# Patient Record
Sex: FEMALE | Race: WHITE | Employment: UNEMPLOYED | URBAN - METROPOLITAN AREA
[De-identification: names, ages, dates, MRNs, and addresses within clinical notes are randomized per-mention and may not be internally consistent; named-entity substitution may affect disease eponyms.]

---

## 2019-01-10 PROBLEM — Z34.90 NORMAL REPEAT PREGNANCY, ANTEPARTUM: Status: ACTIVE | Noted: 2019-01-10

## 2019-01-18 PROBLEM — Z36.82 ENCOUNTER FOR (NT) NUCHAL TRANSLUCENCY SCAN: Status: ACTIVE | Noted: 2019-01-18

## 2019-07-16 ENCOUNTER — ANESTHESIA (OUTPATIENT)
Dept: LABOR AND DELIVERY | Age: 29
DRG: 560 | End: 2019-07-16
Payer: MEDICAID

## 2019-07-16 ENCOUNTER — HOSPITAL ENCOUNTER (INPATIENT)
Age: 29
LOS: 2 days | Discharge: HOME | DRG: 560 | End: 2019-07-18
Attending: OBSTETRICS & GYNECOLOGY | Admitting: OBSTETRICS & GYNECOLOGY
Payer: MEDICAID

## 2019-07-16 ENCOUNTER — ANESTHESIA EVENT (OUTPATIENT)
Dept: LABOR AND DELIVERY | Age: 29
DRG: 560 | End: 2019-07-16
Payer: MEDICAID

## 2019-07-16 PROBLEM — O42.90 DELAYED DELIVERY AFTER SROM (SPONTANEOUS RUPTURE OF MEMBRANES): Status: ACTIVE | Noted: 2019-07-16

## 2019-07-16 LAB
ABO + RH BLD: NORMAL
ARTERIAL PATENCY WRIST A: ABNORMAL
ARTERIAL PATENCY WRIST A: ABNORMAL
BASE DEFICIT BLD-SCNC: 6 MMOL/L
BASE DEFICIT BLD-SCNC: 6 MMOL/L
BDY SITE: ABNORMAL
BDY SITE: ABNORMAL
BLOOD GROUP ANTIBODIES SERPL: NORMAL
BODY TEMPERATURE: 98.6
BODY TEMPERATURE: 98.6
CO2 BLD-SCNC: 20 MMOL/L
CO2 BLD-SCNC: 21 MMOL/L
COLLECT TIME,HTIME: 1058
COLLECT TIME,HTIME: 1058
ERYTHROCYTE [DISTWIDTH] IN BLOOD BY AUTOMATED COUNT: 13.2 % (ref 11.9–14.6)
GAS FLOW.O2 O2 DELIVERY SYS: ABNORMAL L/MIN
GAS FLOW.O2 O2 DELIVERY SYS: ABNORMAL L/MIN
HCO3 BLD-SCNC: 19.2 MMOL/L (ref 22–26)
HCO3 BLDV-SCNC: 19.4 MMOL/L (ref 23–28)
HCT VFR BLD AUTO: 33.3 % (ref 35.8–46.3)
HGB BLD-MCNC: 11 G/DL (ref 11.7–15.4)
MCH RBC QN AUTO: 27.6 PG (ref 26.1–32.9)
MCHC RBC AUTO-ENTMCNC: 33 G/DL (ref 31.4–35)
MCV RBC AUTO: 83.5 FL (ref 79.6–97.8)
NRBC # BLD: 0 K/UL (ref 0–0.2)
PCO2 BLDCO: 36 MMHG (ref 32–68)
PCO2 BLDCO: 36 MMHG (ref 32–68)
PH BLDCO: 7.33 [PH] (ref 7.15–7.38)
PH BLDCO: 7.34 [PH] (ref 7.15–7.38)
PLATELET # BLD AUTO: 200 K/UL (ref 150–450)
PMV BLD AUTO: 11.5 FL (ref 9.4–12.3)
PO2 BLDCO: 27 MMHG
PO2 BLDCO: 27 MMHG
RBC # BLD AUTO: 3.99 M/UL (ref 4.05–5.2)
SAO2 % BLD: 47 % (ref 95–98)
SAO2 % BLDV: 47 % (ref 65–88)
SERVICE CMNT-IMP: ABNORMAL
SERVICE CMNT-IMP: ABNORMAL
SPECIMEN EXP DATE BLD: NORMAL
SPECIMEN TYPE: ABNORMAL
SPECIMEN TYPE: ABNORMAL
WBC # BLD AUTO: 11.3 K/UL (ref 4.3–11.1)

## 2019-07-16 PROCEDURE — 75410000000 HC DELIVERY VAGINAL/SINGLE

## 2019-07-16 PROCEDURE — 74011250636 HC RX REV CODE- 250/636: Performed by: ANESTHESIOLOGY

## 2019-07-16 PROCEDURE — 76060000078 HC EPIDURAL ANESTHESIA

## 2019-07-16 PROCEDURE — 74011000250 HC RX REV CODE- 250

## 2019-07-16 PROCEDURE — 4A1HXCZ MONITORING OF PRODUCTS OF CONCEPTION, CARDIAC RATE, EXTERNAL APPROACH: ICD-10-PCS | Performed by: OBSTETRICS & GYNECOLOGY

## 2019-07-16 PROCEDURE — 74011250636 HC RX REV CODE- 250/636: Performed by: OBSTETRICS & GYNECOLOGY

## 2019-07-16 PROCEDURE — 74011250637 HC RX REV CODE- 250/637: Performed by: OBSTETRICS & GYNECOLOGY

## 2019-07-16 PROCEDURE — 74011000258 HC RX REV CODE- 258: Performed by: OBSTETRICS & GYNECOLOGY

## 2019-07-16 PROCEDURE — 74011250637 HC RX REV CODE- 250/637

## 2019-07-16 PROCEDURE — 75410000003 HC RECOV DEL/VAG/CSECN EA 0.5 HR

## 2019-07-16 PROCEDURE — 65270000029 HC RM PRIVATE

## 2019-07-16 PROCEDURE — A4300 CATH IMPL VASC ACCESS PORTAL: HCPCS | Performed by: ANESTHESIOLOGY

## 2019-07-16 PROCEDURE — 77030020255 HC SOL INJ LR 1000ML BG

## 2019-07-16 PROCEDURE — 82803 BLOOD GASES ANY COMBINATION: CPT

## 2019-07-16 PROCEDURE — 77030003028 HC SUT VCRL J&J -A

## 2019-07-16 PROCEDURE — 86900 BLOOD TYPING SEROLOGIC ABO: CPT

## 2019-07-16 PROCEDURE — 74011250636 HC RX REV CODE- 250/636

## 2019-07-16 PROCEDURE — 77030011943

## 2019-07-16 PROCEDURE — 77030014125 HC TY EPDRL BBMI -B: Performed by: ANESTHESIOLOGY

## 2019-07-16 PROCEDURE — 77030018846 HC SOL IRR STRL H20 ICUM -A

## 2019-07-16 PROCEDURE — 36415 COLL VENOUS BLD VENIPUNCTURE: CPT

## 2019-07-16 PROCEDURE — 0KQM0ZZ REPAIR PERINEUM MUSCLE, OPEN APPROACH: ICD-10-PCS | Performed by: OBSTETRICS & GYNECOLOGY

## 2019-07-16 PROCEDURE — 99283 EMERGENCY DEPT VISIT LOW MDM: CPT | Performed by: OBSTETRICS & GYNECOLOGY

## 2019-07-16 PROCEDURE — 75410000002 HC LABOR FEE PER 1 HR

## 2019-07-16 PROCEDURE — 85027 COMPLETE CBC AUTOMATED: CPT

## 2019-07-16 RX ORDER — SODIUM CHLORIDE 0.9 % (FLUSH) 0.9 %
5-40 SYRINGE (ML) INJECTION EVERY 8 HOURS
Status: DISCONTINUED | OUTPATIENT
Start: 2019-07-16 | End: 2019-07-16

## 2019-07-16 RX ORDER — OXYTOCIN/RINGER'S LACTATE 30/500 ML
500 PLASTIC BAG, INJECTION (ML) INTRAVENOUS
Status: COMPLETED | OUTPATIENT
Start: 2019-07-16 | End: 2019-07-16

## 2019-07-16 RX ORDER — ZOLPIDEM TARTRATE 5 MG/1
5 TABLET ORAL
Status: DISCONTINUED | OUTPATIENT
Start: 2019-07-16 | End: 2019-07-18 | Stop reason: HOSPADM

## 2019-07-16 RX ORDER — FAMOTIDINE 20 MG/1
20 TABLET, FILM COATED ORAL ONCE
Status: DISCONTINUED | OUTPATIENT
Start: 2019-07-16 | End: 2019-07-16

## 2019-07-16 RX ORDER — ONDANSETRON 4 MG/1
4 TABLET, ORALLY DISINTEGRATING ORAL
Status: DISCONTINUED | OUTPATIENT
Start: 2019-07-16 | End: 2019-07-18 | Stop reason: HOSPADM

## 2019-07-16 RX ORDER — DOCUSATE SODIUM 100 MG/1
100 CAPSULE, LIQUID FILLED ORAL 2 TIMES DAILY
Status: DISCONTINUED | OUTPATIENT
Start: 2019-07-16 | End: 2019-07-18 | Stop reason: HOSPADM

## 2019-07-16 RX ORDER — MINERAL OIL
120 OIL (ML) ORAL
Status: COMPLETED | OUTPATIENT
Start: 2019-07-16 | End: 2019-07-16

## 2019-07-16 RX ORDER — ROPIVACAINE HYDROCHLORIDE 2 MG/ML
INJECTION, SOLUTION EPIDURAL; INFILTRATION; PERINEURAL
Status: DISCONTINUED | OUTPATIENT
Start: 2019-07-16 | End: 2019-07-16 | Stop reason: HOSPADM

## 2019-07-16 RX ORDER — DIPHENHYDRAMINE HCL 25 MG
25 CAPSULE ORAL
Status: DISCONTINUED | OUTPATIENT
Start: 2019-07-16 | End: 2019-07-18 | Stop reason: HOSPADM

## 2019-07-16 RX ORDER — MISOPROSTOL 200 UG/1
TABLET ORAL
Status: COMPLETED
Start: 2019-07-16 | End: 2019-07-16

## 2019-07-16 RX ORDER — HYDROCODONE BITARTRATE AND ACETAMINOPHEN 7.5; 325 MG/1; MG/1
1 TABLET ORAL
Status: DISCONTINUED | OUTPATIENT
Start: 2019-07-16 | End: 2019-07-18 | Stop reason: HOSPADM

## 2019-07-16 RX ORDER — SODIUM CHLORIDE 0.9 % (FLUSH) 0.9 %
5-40 SYRINGE (ML) INJECTION AS NEEDED
Status: DISCONTINUED | OUTPATIENT
Start: 2019-07-16 | End: 2019-07-16

## 2019-07-16 RX ORDER — LIDOCAINE HYDROCHLORIDE 20 MG/ML
JELLY TOPICAL ONCE
Status: DISCONTINUED | OUTPATIENT
Start: 2019-07-16 | End: 2019-07-16

## 2019-07-16 RX ORDER — LIDOCAINE HYDROCHLORIDE 20 MG/ML
JELLY TOPICAL
Status: DISCONTINUED | OUTPATIENT
Start: 2019-07-16 | End: 2019-07-16 | Stop reason: RX

## 2019-07-16 RX ORDER — LOPERAMIDE HYDROCHLORIDE 2 MG/1
2 CAPSULE ORAL AS NEEDED
Status: DISCONTINUED | OUTPATIENT
Start: 2019-07-16 | End: 2019-07-18 | Stop reason: HOSPADM

## 2019-07-16 RX ORDER — OXYTOCIN/RINGER'S LACTATE 30/500 ML
PLASTIC BAG, INJECTION (ML) INTRAVENOUS
Status: COMPLETED
Start: 2019-07-16 | End: 2019-07-16

## 2019-07-16 RX ORDER — OXYTOCIN/RINGER'S LACTATE 15/250 ML
250 PLASTIC BAG, INJECTION (ML) INTRAVENOUS ONCE
Status: COMPLETED | OUTPATIENT
Start: 2019-07-16 | End: 2019-07-16

## 2019-07-16 RX ORDER — IBUPROFEN 800 MG/1
800 TABLET ORAL
Status: DISCONTINUED | OUTPATIENT
Start: 2019-07-16 | End: 2019-07-18 | Stop reason: HOSPADM

## 2019-07-16 RX ORDER — ROPIVACAINE HYDROCHLORIDE 2 MG/ML
INJECTION, SOLUTION EPIDURAL; INFILTRATION; PERINEURAL AS NEEDED
Status: DISCONTINUED | OUTPATIENT
Start: 2019-07-16 | End: 2019-07-16 | Stop reason: HOSPADM

## 2019-07-16 RX ORDER — LIDOCAINE HYDROCHLORIDE AND EPINEPHRINE 15; 5 MG/ML; UG/ML
INJECTION, SOLUTION EPIDURAL
Status: COMPLETED | OUTPATIENT
Start: 2019-07-16 | End: 2019-07-16

## 2019-07-16 RX ORDER — BUTORPHANOL TARTRATE 2 MG/ML
1 INJECTION INTRAMUSCULAR; INTRAVENOUS
Status: DISCONTINUED | OUTPATIENT
Start: 2019-07-16 | End: 2019-07-16

## 2019-07-16 RX ORDER — DEXTROSE, SODIUM CHLORIDE, SODIUM LACTATE, POTASSIUM CHLORIDE, AND CALCIUM CHLORIDE 5; .6; .31; .03; .02 G/100ML; G/100ML; G/100ML; G/100ML; G/100ML
125 INJECTION, SOLUTION INTRAVENOUS CONTINUOUS
Status: DISCONTINUED | OUTPATIENT
Start: 2019-07-16 | End: 2019-07-16

## 2019-07-16 RX ORDER — MISOPROSTOL 200 UG/1
600 TABLET ORAL
Status: COMPLETED | OUTPATIENT
Start: 2019-07-16 | End: 2019-07-16

## 2019-07-16 RX ORDER — SIMETHICONE 80 MG
80 TABLET,CHEWABLE ORAL
Status: DISCONTINUED | OUTPATIENT
Start: 2019-07-16 | End: 2019-07-18 | Stop reason: HOSPADM

## 2019-07-16 RX ORDER — LIDOCAINE HYDROCHLORIDE 10 MG/ML
1 INJECTION INFILTRATION; PERINEURAL
Status: DISCONTINUED | OUTPATIENT
Start: 2019-07-16 | End: 2019-07-16

## 2019-07-16 RX ADMIN — LIDOCAINE HYDROCHLORIDE AND EPINEPHRINE 3.5 ML: 15; 5 INJECTION, SOLUTION EPIDURAL at 08:52

## 2019-07-16 RX ADMIN — WITCH HAZEL 1 PAD: 500 SOLUTION RECTAL; TOPICAL at 14:32

## 2019-07-16 RX ADMIN — DOCUSATE SODIUM 100 MG: 100 CAPSULE, LIQUID FILLED ORAL at 17:04

## 2019-07-16 RX ADMIN — MINERAL OIL 120 ML: 471.95 OIL ORAL at 10:36

## 2019-07-16 RX ADMIN — Medication 30000 MILLI-UNITS/HR: at 11:28

## 2019-07-16 RX ADMIN — SODIUM CHLORIDE 5 MILLION UNITS: 900 INJECTION, SOLUTION INTRAVENOUS at 07:39

## 2019-07-16 RX ADMIN — OXYTOCIN 30000 MILLI-UNITS/HR: 10 INJECTION, SOLUTION INTRAMUSCULAR; INTRAVENOUS at 11:28

## 2019-07-16 RX ADMIN — Medication 1 AMPULE: at 21:51

## 2019-07-16 RX ADMIN — Medication 1 AMPULE: at 14:32

## 2019-07-16 RX ADMIN — ROPIVACAINE HYDROCHLORIDE 5 ML: 2 INJECTION, SOLUTION EPIDURAL; INFILTRATION; PERINEURAL at 08:56

## 2019-07-16 RX ADMIN — ROPIVACAINE HYDROCHLORIDE 10 ML/HR: 2 INJECTION, SOLUTION EPIDURAL; INFILTRATION; PERINEURAL at 08:58

## 2019-07-16 RX ADMIN — IBUPROFEN 800 MG: 800 TABLET, FILM COATED ORAL at 14:42

## 2019-07-16 RX ADMIN — MISOPROSTOL 600 MCG: 200 TABLET ORAL at 11:29

## 2019-07-16 RX ADMIN — SODIUM CHLORIDE, SODIUM LACTATE, POTASSIUM CHLORIDE, CALCIUM CHLORIDE, AND DEXTROSE MONOHYDRATE 125 ML/HR: 600; 310; 30; 20; 5 INJECTION, SOLUTION INTRAVENOUS at 07:39

## 2019-07-16 RX ADMIN — ROPIVACAINE HYDROCHLORIDE 5 ML: 2 INJECTION, SOLUTION EPIDURAL; INFILTRATION; PERINEURAL at 08:58

## 2019-07-16 RX ADMIN — SODIUM CHLORIDE, SODIUM LACTATE, POTASSIUM CHLORIDE, AND CALCIUM CHLORIDE 1000 ML: 600; 310; 30; 20 INJECTION, SOLUTION INTRAVENOUS at 09:45

## 2019-07-16 RX ADMIN — Medication 15000 MILLI-UNITS/HR: at 11:29

## 2019-07-16 RX ADMIN — IBUPROFEN 800 MG: 800 TABLET, FILM COATED ORAL at 21:51

## 2019-07-16 NOTE — PROGRESS NOTES
Delivery and repair by Dr. Christelle Callejas, I was tied up in another delivery. Mom and baby stable. Some bleeding, cytotec given. Will watch, give methergine if needed.

## 2019-07-16 NOTE — PROGRESS NOTES
1030 Pt reports increased pain with contractions. SVE C/C/+2. MD made aware. Finishing case, will be in to assess. 36 MD at bedside. Begin pushing. 1 MD called out of room for emergency. Dr. Sharon Walker at bedside. 44  male apgars 8&9. Skin to skin with Mom.  1103 Placenta expressed. Repair in progress. 1115 Legs down solomon care completed.

## 2019-07-16 NOTE — ANESTHESIA PREPROCEDURE EVALUATION
Relevant Problems   No relevant active problems       Anesthetic History   No history of anesthetic complications            Review of Systems / Medical History  Patient summary reviewed and pertinent labs reviewed    Pulmonary  Within defined limits                 Neuro/Psych   Within defined limits           Cardiovascular  Within defined limits                Exercise tolerance: >4 METS     GI/Hepatic/Renal  Within defined limits              Endo/Other  Within defined limits           Other Findings              Physical Exam    Airway  Mallampati: II  TM Distance: 4 - 6 cm  Neck ROM: normal range of motion   Mouth opening: Normal     Cardiovascular               Dental  No notable dental hx       Pulmonary                 Abdominal         Other Findings            Anesthetic Plan    ASA: 2  Anesthesia type: epidural            Anesthetic plan and risks discussed with: Patient

## 2019-07-16 NOTE — H&P
History & Physical    Name: Carmel Marvin MRN: [id number]  SSN: [id number]    YOB: 1990  Age: 30 y.o. Sex: female        Subjective:     Estimated Date of Delivery: 19  OB History    Para Term  AB Living   4 1 1 0 2 1   SAB TAB Ectopic Molar Multiple Live Births   0 0 0 0 0 1      # Outcome Date GA Lbr Junior/2nd Weight Sex Delivery Anes PTL Lv   4 Current            3 Term 16 39w0d 05:35 / 00:53 3.551 kg F Vag-Spont Local N YUSRA   2 AB            1 AB                Ms. Walter Nails is seen with pregnancy at 38w3d for SROM at 0430, clear, now w painful contractions. Prenatal course was normal.  the patients states that the baby moves as usual   Please see prenatal records for details. Past Medical History:   Diagnosis Date    Anemia     Chlamydia          No past surgical history on file. Social History     Occupational History    Not on file   Tobacco Use    Smoking status: Never Smoker    Smokeless tobacco: Never Used   Substance and Sexual Activity    Alcohol use: No    Drug use: No    Sexual activity: Yes     Partners: Male     Birth control/protection: None     Family History   Problem Relation Age of Onset    Diabetes Neg Hx     Hypertension Neg Hx        No Known Allergies  Prior to Admission medications    Medication Sig Start Date End Date Taking? Authorizing Provider   cholecalciferol, vitamin D3, (VITAMIN D3) 2,000 unit tab Take  by mouth. Yes Provider, Historical   PNV with Ca No.65-Iron Poly-FA 60 mg iron-1 mg cap Take  by mouth.  Indications: Pregnancy   Yes Provider, Historical        Review of Systems:  Constitutional:No headache, fever  Cardiac:   No chest pain      Resp: No cough or shortness of breath     GI:   No nausea/vomiting, diarrhea, abdominal pain    :   No dysuria  Neuro:     No vision changes, headache      Objective:     Vitals:  Vitals:    19 0642 19 0643 19 0644 19 0701   BP: 138/83 138/83  146/88 Pulse:  (!) 130  (!) 122   Resp: 20   20   Temp:    100.3 °F (37.9 °C)   Weight:   83 kg (183 lb)    Height:   5' 4\" (1.626 m)         Physical Exam:  Patient without distress. Heart: Regular rate and rhythm  Lung: clear to auscultation throughout lung fields, no wheezes, no rales, no rhonchi and normal respiratory effort  Back: costovertebral angle tenderness absent  Abdomen: soft, nontender  Fundus: soft and non tender  Cervical Exam: 4 cm dilated    70% effaced    -2 station    Presenting Part: cephalic  Lower Extremities:  - Edema No  Membranes:  Spontaneous Rupture of Membranes; Amniotic Fluid: clear fluid  Fetal Heart Rate: Baseline: 150 per minute  Variability: moderate  Accelerations: yes  Decelerations: none  Uterine contractions: regular, every 3 minutes    Prenatal Labs:   Lab Results   Component Value Date/Time    Rubella, External Immune 10/03/2016    GrBStrep, External Positive 2019    HBsAg, External Negative 10/03/2016    RPR, External NonReactive 10/03/2016         Assessment/Plan:     Ms. Vinita Valladares is a  seen with pregnancy at 38w3d for active labor. Lab Results   Component Value Date/Time    GrBStrep, External Positive 2019       Plan:     Admit for labor management, PCN for pos GBS,will follow her temp, cbc pending.     Patient discussed with Dr. Nelly Wilson

## 2019-07-16 NOTE — PROGRESS NOTES
Dr. Sharon Walker at bedside. SVE clots expressed. Moderate bleeding, fundus firm. Extra pitocin and Cytotec buccal given per MD order.

## 2019-07-16 NOTE — LACTATION NOTE
This note was copied from a baby's chart. Mother wanting to breastfeed at this time. This RN assisted mother with getting infant latched on left side, mother noted to have short nipples. This RN was able to express several small drops, baby latched for only a few seconds. Baby would only suck continuously for 11 sucks then stopped. Educated mother to hold breast tissue back to allow one nostril to be open to air. Tried to stimulate baby by rubbing head. Baby fed for a few more seconds then stopped. Mother requested to get baby swaddled and to attempt again later because she has visitors waiting. Baby swaddled and mother holding baby upon this RN leaving the room.

## 2019-07-16 NOTE — PROGRESS NOTES
SBAR IN Report: Mother    Verbal report received from Angela Chen (full name & credentials) on this patient, who is now being transferred from L&D (unit) for routine progression of care. The patient is not wearing a green \"Anesthesia-Duramorph\" band. Report consisted of patient's Situation, Background, Assessment and Recommendations (SBAR). Griffin ID bands were compared with the identification form, and verified with the patient and transferring nurse. Information from the SBAR, Procedure Summary, Intake/Output, MAR and Recent Results and the [facility] Report was reviewed with the transferring nurse; opportunity for questions and clarification provided.

## 2019-07-16 NOTE — ANESTHESIA PROCEDURE NOTES
Epidural Block    Start time: 7/16/2019 8:49 AM  End time: 7/16/2019 8:55 AM  Performed by: Brandon Solis MD  Authorized by: Brandon Solis MD     Pre-Procedure  Indication: labor epidural    Preanesthetic Checklist: patient identified, risks and benefits discussed, anesthesia consent, site marked, patient being monitored, timeout performed and anesthesia consent    Timeout Time: 08:48        Epidural:   Patient position:  Seated  Prep region:  Lumbar  Prep: Patient draped and Chlorhexidine    Location:  L3-4    Needle and Epidural Catheter:   Needle Type:  Tuohy  Needle Gauge:  19 G  Injection Technique:  Loss of resistance using saline  Attempts:  1  Catheter Size:  19 G  Catheter at Skin Depth (cm):  12  Depth in Epidural Space (cm):  6  Events: no blood with aspiration, no cerebrospinal fluid with aspiration, no paresthesia and negative aspiration test    Test Dose:  Lidocaine 1.5% w/ epi and negative    Assessment:   Catheter Secured:  Tegaderm and tape  Insertion:  Uncomplicated  Patient tolerance:  Patient tolerated the procedure well with no immediate complications

## 2019-07-16 NOTE — L&D DELIVERY NOTE
Delivery Summary    Patient: Amilcar Mendez MRN: [id number]  SSN: [id number]    YOB: 1990  Age: 30 y.o. Sex: female       Information for the patient's :  Ravi Cuong [[id number]]       Labor Events:    Labor: No    Steroids: None   Cervical Ripening Date/Time:       Cervical Ripening Type: None   Antibiotics During Labor: Yes   Rupture Identifier:      Rupture Date/Time: 2019 4:30 AM   Rupture Type: SROM   Amniotic Fluid Volume:      Amniotic Fluid Description:      Amniotic Fluid Odor:      Induction: None       Induction Date/Time:        Indications for Induction:      Augmentation: None   Augmentation Date/Time:      Indications for Augmentation:     Labor complications: None       Additional complications:        Delivery Events:  Indications For Episiotomy:     Episiotomy: None   Perineal Laceration(s): 2nd   Repaired:     Periurethral Laceration Location:      Repaired:     Labial Laceration Location:     Repaired:     Sulcal Laceration Location:     Repaired:     Vaginal Laceration Location:     Repaired:     Cervical Laceration Location:     Repaired:     Repair Suture: Vicryl 3-0   Number of Repair Packets: 2   Estimated Blood Loss (ml): 400ml     Delivery Date: 2019    Delivery Time: 10:58 AM  Delivery Type: Vaginal, Spontaneous  Sex:  Male    Gestational Age: 36w4d   Delivery Clinician:  Luiz Broderick  Living Status: Living   Delivery Location: L&D 428          APGARS  One minute Five minutes Ten minutes   Skin color: 0   1        Heart rate: 2   2        Grimace: 2   2        Muscle tone: 2   2        Breathin   2        Totals: 8   9            Presentation: Vertex    Position: Left Occiput Anterior  Resuscitation Method:  Tactile Stimulation;Suctioning-bulb     Meconium Stained: None      Cord Information: 3 Vessels  Complications: None  Cord around:    Delayed cord clamping?  No  Cord clamped date/time:2019 10:59 AM  Disposition of Cord Blood: Lab    Blood Gases Sent?: Yes    Placenta:  Date/Time: 2019 11:03 AM  Removal: Expressed      Appearance: Normal      Measurements:  Birth Weight: 3.725 kg      Birth Length:        Head Circumference:        Chest Circumference:       Abdominal Girth: Other Providers:   KYLE Cosme;ANTONIETA RATLIFF;AMALIA HICKEY;GALDINO LOZADA, Obstetrician;Primary Nurse;Scrub Tech; Anesthesiologist;Crna           Group B Strep:   Lab Results   Component Value Date/Time    GrBStrep, External Positive 2019     Information for the patient's :  Marlee Hankins [[id number]]   No results found for: ABORH, PCTABR, PCTDIG, BILI, ABORHEXT, ABORH    No results for input(s): PCO2CB, PO2CB, HCO3I, SO2I, IBD, PTEMPI, SPECTI, PHICB, ISITE, IDEV, IALLEN in the last 72 hours. Pt experienced uncomplicated  of viable male, Apgars 8/9. Placenta delivered spontaneously intact. EBL 400mls. 2nd degree perineal/vaginal laceration repaired with 3-0 Vicryl. Pt tolerated the procedure well.

## 2019-07-16 NOTE — PROGRESS NOTES
0845 Anesthesia in room. 5060 Timeout per anesthesia record. Pt to side of bed for epidural placement. 0714 Epidural cath in place. Test dose per MD. Serial BP as documented. 0859 Pt to left hip tilt. Tolerated well.

## 2019-07-16 NOTE — PROGRESS NOTES
Patient presents to triage from home with complaints of SROM and contractions, denies any bleeding reports good fetal movement.

## 2019-07-16 NOTE — LACTATION NOTE
This note was copied from a baby's chart. Mom reports feedings going well. Concerns about supply with first baby. Reviewed she may want to insurance pump within the next 1-2 days. Reviewed first 24 hour expectations. Discussed feeding expectations in second day. Encouraged to try at breast.  Encouraged skin to skin. Reviewed Breastfeeding Packet. Baby last nursed 1 hours ago. Offered assistance with positioning. Mom declined for now, but would like an observation prior to discharge.

## 2019-07-16 NOTE — LACTATION NOTE

## 2019-07-16 NOTE — PROGRESS NOTES
Pt oriented to room and call light. Admission assessment complete see flowsheet. Pt denies pain at this time. Discussed today plan of care with pt. Pt instructed to call for assistance prior to getting OOB. Pt voiced understanding. Pt to call with needs/concerns. No sign/symptoms of distress noted. Pt in bed eating with call light in reach.

## 2019-07-16 NOTE — PROGRESS NOTES
Pt called out due to c/o increase vaginal bleeding. Upon assessment fundus firm/-1 midline with small bleeding noted. Quiana care performed. No sign/symptoms of distress noted. Pt in bed with call light in reach.

## 2019-07-16 NOTE — PROGRESS NOTES
Pt up and ambulated to bathroom with no complaints. Pt able to void 700ml, no clots noted in toilet. Solmoon care performed by pt, solomon pad changed. Gown changed. Pt ambulated back to bed, pt tolerated ambulating well. Pt in bed with call light in reach.

## 2019-07-16 NOTE — PROGRESS NOTES
SBAR OUT Report: Mother    Verbal report given to Lev Sosa RN on this patient, who is now being transferred to [facility] for routine progression of care. The patient is not wearing a green \"Anesthesia-Duramorph\" band. Report consisted of patient's Situation, Background, Assessment and Recommendations (SBAR).  ID bands were compared with the identification form, and verified with the patient and receiving nurse. Information from the SBAR and the [address] [facility] Report was reviewed with the receiving nurse; opportunity for questions and clarification provided.

## 2019-07-17 PROCEDURE — 65270000029 HC RM PRIVATE

## 2019-07-17 PROCEDURE — 74011250637 HC RX REV CODE- 250/637: Performed by: OBSTETRICS & GYNECOLOGY

## 2019-07-17 RX ADMIN — DOCUSATE SODIUM 100 MG: 100 CAPSULE, LIQUID FILLED ORAL at 07:12

## 2019-07-17 RX ADMIN — IBUPROFEN 800 MG: 800 TABLET, FILM COATED ORAL at 21:23

## 2019-07-17 RX ADMIN — DOCUSATE SODIUM 100 MG: 100 CAPSULE, LIQUID FILLED ORAL at 17:50

## 2019-07-17 RX ADMIN — Medication 1 AMPULE: at 07:12

## 2019-07-17 RX ADMIN — IBUPROFEN 800 MG: 800 TABLET, FILM COATED ORAL at 07:13

## 2019-07-17 RX ADMIN — Medication 1 AMPULE: at 21:23

## 2019-07-17 RX ADMIN — IBUPROFEN 800 MG: 800 TABLET, FILM COATED ORAL at 15:34

## 2019-07-17 NOTE — PROGRESS NOTES
Progress Note                               Patient: Rafat Chanel MRN: [id number]  SSN: [id number]    YOB: 1990  Age: 30 y.o. Sex: female      Postpartum Day Number 1    Subjective:     Patient doing well postpartum without significant complaints. Voiding without difficulty. Patient reports normal lochia. . Breastfeeding: Yes     Objective:     Patient Vitals for the past 18 hrs:   Temp Pulse Resp BP SpO2   19 0700 97.8 °F (36.6 °C) 96 18 117/57 99 %   19 1938 98.8 °F (37.1 °C) 97 16 117/68 97 %        Temp (24hrs), Av.8 °F (37.7 °C), Min:97.8 °F (36.6 °C), Max:101.6 °F (38.7 °C)      Physical Exam:    General:   Patient without distress. Abdomen: Soft, fundus firm at level of umbilicus, nontender   Lower Extremities: Negative for swelling, cords, or tenderness. Lab/Data Review:  CBC:    Recent Labs     19  0748   WBC 11.3*   HGB 11.0*   HCT 33.3*        Blood Type:   Lab Results   Component Value Date/Time    ABO/Rh(D) B POSITIVE 2019 07:48 AM    ABO,Rh B Positive 10/03/2016      Infant's Blood Type: Information for the patient's :  Noris Card [[id number]]     Lab Results   Component Value Date/Time    ABO/Rh(D) A NEGATIVE 2019 10:58 AM       Assessment and Plan:     Patient appears to be having an uncomplicated postpartum course. Continue routine perineal care and maternal education.     Sun Can MD  12:56 PM

## 2019-07-18 VITALS
SYSTOLIC BLOOD PRESSURE: 113 MMHG | WEIGHT: 183 LBS | HEART RATE: 82 BPM | OXYGEN SATURATION: 97 % | BODY MASS INDEX: 31.24 KG/M2 | RESPIRATION RATE: 16 BRPM | DIASTOLIC BLOOD PRESSURE: 48 MMHG | TEMPERATURE: 97.8 F | HEIGHT: 64 IN

## 2019-07-18 PROCEDURE — 74011250637 HC RX REV CODE- 250/637: Performed by: OBSTETRICS & GYNECOLOGY

## 2019-07-18 RX ORDER — IBUPROFEN 800 MG/1
800 TABLET ORAL
Qty: 90 TAB | Refills: 1 | Status: SHIPPED | OUTPATIENT
Start: 2019-07-18

## 2019-07-18 RX ADMIN — IBUPROFEN 800 MG: 800 TABLET, FILM COATED ORAL at 05:17

## 2019-07-18 RX ADMIN — Medication 1 AMPULE: at 10:27

## 2019-07-18 RX ADMIN — DOCUSATE SODIUM 100 MG: 100 CAPSULE, LIQUID FILLED ORAL at 10:27

## 2019-07-18 NOTE — PROGRESS NOTES
Progress Note                               Patient: Richardson Muse MRN: [id number]  SSN: [id number]    YOB: 1990  Age: 30 y.o. Sex: female      Postpartum Day Number 2    Subjective:     Patient doing well postpartum without significant complaints. Voiding without difficulty. Patient reports normal lochia. Pain well controlled, voiding on her own without difficulty. Breast feeding. Denies HA, SOB/CP, RUQ pain, Vision changes. Objective:     Patient Vitals for the past 12 hrs:   Temp Pulse Resp BP SpO2   19 0700 97.8 °F (36.6 °C) 82 16 113/48 97 %       Temp (24hrs), Av.8 °F (36.6 °C), Min:97.7 °F (36.5 °C), Max:97.8 °F (36.6 °C)      Physical Exam:    Constitutional: She appears well-developed and well-nourished. No distress. HENT:    Head: Normocephalic and atraumatic.    Cardiovascular: RRR  Pulmonary/Chest: CTAB  Abd:  S/NTTP/ND, BS normoactive, fundus firm at umbilicus  Ext:  No c/c/e      Lab/Data Review:  CBC:    Recent Labs     19  0748   WBC 11.3*   HGB 11.0*   HCT 33.3*        GBS:   Lab Results   Component Value Date/Time    GrBStrep, External Positive 2019     Blood Type:   Lab Results   Component Value Date/Time    ABO/Rh(D) B POSITIVE 2019 07:48 AM    ABO,Rh B Positive 10/03/2016        Assessment and Plan:     30 y.o.  ppd# 2 s/p :    1) PP:  Meeting all pp goals, continue routine care; appropriate for DC home today  2) Breast feeding, Rh pos, Rub imm          Signed By: Loretta Suero MD     2019

## 2019-07-18 NOTE — PROGRESS NOTES
Chart reviewed - no needs identified.  made introduction to family and provided informational packet on  mood disorder education/resources. Patient denies any history of postpartum depression/anxiety. Family receptive to receiving information and denied any additional needs from . Patient does not have a PCP - list of PCPs provided. Family has 's contact information should any needs/questions arise.     RACHEL Fernando  [facility]   [phone]

## 2019-07-18 NOTE — LACTATION NOTE
This note was copied from a baby's chart. In to follow up w/ mom and infant. Baby has been doing only short feeds mostly for first day. Baby just circumcised one hr ago. Reviewed 2nd 24 hr feeding/output expectations. Will meet mom for next feeding. Everyone in room napping.

## 2019-07-18 NOTE — DISCHARGE INSTRUCTIONS
After Your Delivery (the Postpartum Period): Care Instructions  Your Care Instructions    Congratulations on the birth of your baby. Like pregnancy, the  period can be a time of excitement, bree, and exhaustion. You may look at your wondrous little baby and feel happy. You may also be overwhelmed by your new sleep hours and new responsibilities. At first, babies often sleep during the days and are awake at night. They do not have a pattern or routine. They may make sudden gasps, jerk themselves awake, or look like they have crossed eyes. These are all normal, and they may even make you smile. In these first weeks after delivery, try to take good care of yourself. It may take 4 to 6 weeks to feel like yourself again, and possibly longer if you had a  birth. You will likely feel very tired for several weeks. Your days will be full of ups and downs, but lots of bree as well. Follow-up care is a key part of your treatment and safety. Be sure to make and go to all appointments, and call your doctor if you are having problems. It's also a good idea to know your test results and keep a list of the medicines you take. How can you care for yourself at home? Take care of your body after delivery  · Use pads instead of tampons for the bloody flow that may last as long as 2 weeks. · Ease cramps with ibuprofen (Advil, Motrin). · Ease soreness of hemorrhoids and the area between your vagina and rectum with ice compresses or witch hazel pads. · Ease constipation by drinking lots of fluid and eating high-fiber foods. Ask your doctor about over-the-counter stool softeners. · Cleanse yourself with a gentle squeeze of warm water from a bottle instead of wiping with toilet paper. · Take a sitz bath in warm water several times a day. · Wear a good nursing bra. Ease sore and swollen breasts with warm, wet washcloths. · If you are not breastfeeding, use ice rather than heat for breast soreness.   · Your period may not start for several months if you are breastfeeding. You may bleed more, and longer at first, than you did before you got pregnant. · Wait until you are healed (about 4 to 6 weeks) before you have sexual intercourse. Your doctor will tell you when it is okay to have sex. · Try not to travel with your baby for 5 or 6 weeks. If you take a long car trip, make frequent stops to walk around and stretch. Avoid exhaustion  · Rest every day. Try to nap when your baby naps. · Ask another adult to be with you for a few days after delivery. · Plan for  if you have other children. · Stay flexible so you can eat at odd hours and sleep when you need to. Both you and your baby are making new schedules. · Plan small trips to get out of the house. Change can make you feel less tired. · Ask for help with housework, cooking, and shopping. Remind yourself that your job is to care for your baby. Know about help for postpartum depression  · \"Baby blues\" are common for the first 1 to 2 weeks after birth. You may cry or feel sad or irritable for no reason. · Rest whenever you can. Being tired makes it harder to handle your emotions. · Go for walks with your baby. · Talk to your partner, friends, and family about your feelings. · If your symptoms last for more than a few weeks, or if you feel very depressed, ask your doctor for help. · Postpartum depression can be treated. Support groups and counseling can help. Sometimes medicine can also help. Stay healthy  · Eat healthy foods so you have more energy and lose extra baby pounds. · If you breastfeed, avoid drugs. If you quit smoking during pregnancy, try to stay smoke-free. If you choose to have a drink now and then, have only one drink, and limit the number of occasions that you have a drink. Wait to breastfeed at least 2 hours after you have a drink to reduce the amount of alcohol the baby may get in the milk.   · Start daily exercise after 4 to 6 weeks, but rest when you feel tired. · Learn exercises to tone your belly. Do Kegel exercises to regain strength in your pelvic muscles. You can do these exercises while you stand or sit. ? Squeeze the same muscles you would use to stop your urine. Your belly and thighs should not move. ? Hold the squeeze for 3 seconds, and then relax for 3 seconds. ? Start with 3 seconds. Then add 1 second each week until you are able to squeeze for 10 seconds. ? Repeat the exercise 10 to 15 times for each session. Do three or more sessions each day. · Find a class for new mothers and new babies that has an exercise time. · If you had a  birth, give yourself a bit more time before you exercise, and be careful. When should you call for help? Call 911 anytime you think you may need emergency care. For example, call if:    · You passed out (lost consciousness).    Call your doctor now or seek immediate medical care if:    · You have severe vaginal bleeding. This means you are passing blood clots and soaking through a pad each hour for 2 or more hours.     · You are dizzy or lightheaded, or you feel like you may faint.     · You have a fever.     · You have new belly pain, or your pain gets worse.    Watch closely for changes in your health, and be sure to contact your doctor if:    · Your vaginal bleeding seems to be getting heavier.     · You have new or worse vaginal discharge.     · You feel sad, anxious, or hopeless for more than a few days.     · You do not get better as expected. Where can you learn more? Go to http://lexa-flori.info/. Enter A461 in the search box to learn more about \"After Your Delivery (the Postpartum Period): Care Instructions. \"  Current as of: 2018  Content Version: 11.9  © 3431-9672 [employer]. Care instructions adapted under license by [employer] (which disclaims liability or warranty for this information).  If you have questions about a medical condition or this instruction, always ask your healthcare professional. [address] any warranty or liability for your use of this information. Vaginal Childbirth: Care Instructions  Your Care Instructions    Your body will slowly heal in the next few weeks. It is easy to get too tired and overwhelmed during the first weeks after your baby is born. Changes in your hormones can shift your mood without warning. You may find it hard to meet the extra demands on your energy and time. Take it easy on yourself. Follow-up care is a key part of your treatment and safety. Be sure to make and go to all appointments, and call your doctor if you are having problems. It's also a good idea to know your test results and keep a list of the medicines you take. How can you care for yourself at home? · Vaginal bleeding and cramps  ? After delivery, you will have a bloody discharge from the vagina. This will turn pink within a week and then white or yellow after about 10 days. It may last for 2 to 4 weeks or longer, until the uterus has healed. Use pads instead of tampons until you stop bleeding. ? Do not worry if you pass some blood clots, as long as they are smaller than a golf ball. If you have a tear or stitches in your vaginal area, change the pad at least every 4 hours to prevent soreness and infection. ? You may have cramps for the first few days after childbirth. These are normal and occur as the uterus shrinks to normal size. Take an over-the-counter pain medicine, such as acetaminophen (Tylenol), ibuprofen (Advil, Motrin), or naproxen (Aleve), for cramps. Read and follow all instructions on the label. Do not take aspirin, because it can cause more bleeding. ? Do not take two or more pain medicines at the same time unless the doctor told you to. Many pain medicines have acetaminophen, which is Tylenol. Too much acetaminophen (Tylenol) can be harmful. · Stitches  ?  If you have stitches, they will dissolve on their own and do not need to be removed. Follow your doctor's instructions for cleaning the stitched area. ? Put ice or a cold pack on your painful area for 10 to 20 minutes at a time, several times a day, for the first few days. Put a thin cloth between the ice and your skin. ? Sit in a few inches of warm water (sitz bath) 3 times a day and after bowel movements. The warm water helps with pain and itching. If you do not have a tub, a warm shower might help. · Breast fullness  ? Your breasts may overfill (engorge) in the first few days after delivery. To help milk flow and to relieve pain, warm your breasts in the shower or by using warm, moist towels before nursing. ? If you are not nursing, do not put warmth on your breasts or touch your breasts. Wear a tight bra or sports bra and use ice until the fullness goes away. This usually takes 2 to 3 days. ? Put ice or a cold pack on your breast after nursing to reduce swelling and pain. Put a thin cloth between the ice and your skin. · Activity  ? Eat a balanced diet. Do not try to lose weight by cutting calories. Keep taking your prenatal vitamins, or take a multivitamin. ? Get as much rest as you can. Try to take naps when your baby sleeps during the day. ? Get some exercise every day. But do not do any heavy exercise until your doctor says it is okay. ? Wait until you are healed (about 4 to 6 weeks) before you have sexual intercourse. Your doctor will tell you when it is okay to have sex. ? Talk to your doctor about birth control. You can get pregnant even before your period returns. Also, you can get pregnant while you are breastfeeding. · Mental health  ? It is normal to have some sadness, anxiety, sleeplessness, and mood swings after you go home. If you feel upset or hopeless for more than a few days or are having trouble doing the things you need to do, talk to your doctor. · Constipation and hemorrhoids  ?  Drink plenty of fluids, enough so that your urine is light yellow or clear like water. If you have kidney, heart, or liver disease and have to limit fluids, talk with your doctor before you increase the amount of fluids you drink. ? Eat plenty of fiber each day. Have a bran muffin or bran cereal for breakfast, and try eating a piece of fruit for a mid-afternoon snack. ? For painful, itchy hemorrhoids, put ice or a cold pack on the area several times a day for 10 minutes at a time. Follow this by putting a warm compress on the area for another 10 to 20 minutes or by sitting in a shallow, warm bath. When should you call for help? Call 911 anytime you think you may need emergency care. For example, call if:    · You passed out (lost consciousness).    Call your doctor now or seek immediate medical care if:    · You have severe vaginal bleeding.     · You are dizzy or lightheaded, or you feel like you may faint.     · You have a fever.     · You have new or more pain in your belly or pelvis.    Watch closely for changes in your health, and be sure to contact your doctor if:    · Your vaginal bleeding seems to be getting heavier.     · You have new or worse vaginal discharge.     · You feel sad, anxious, or hopeless for more than a few days.     · You do not get better as expected. Where can you learn more? Go to http://lexa-flori.info/. Enter U950 in the search box to learn more about \"Vaginal Childbirth: Care Instructions. \"  Current as of: September 5, 2018  Content Version: 11.9  © 6576-6041 [employer]. Care instructions adapted under license by [employer] (which disclaims liability or warranty for this information). If you have questions about a medical condition or this instruction, always ask your healthcare professional. [address] any warranty or liability for your use of this information.

## 2019-07-18 NOTE — LACTATION NOTE

## 2019-07-18 NOTE — DISCHARGE SUMMARY
Obstetrical Discharge Summary     Name: Du Fraser MRN: [id number]  SSN: [id number]    YOB: 1990  Age: 30 y.o. Sex: female      Allergies: Patient has no known allergies. Admit Date: 2019    Discharge Date: 2019     Admitting Physician: Delroy Saravia MD     Attending Physician:  Nikole Landeros MD     * Admission Diagnoses: Delayed delivery after SROM (spontaneous rupture of membranes) [O42.90]; Delayed delivery after SROM (spontaneous rupture of membranes) [O42.90]    * Discharge Diagnoses:   Information for the patient's :  Angelita Holter [[id number]]   Delivery of a 8 lb 3.4 oz (3.725 kg) male infant via Vaginal, Spontaneous on 2019 at 10:58 AM  by Daniel Flores. Apgars were 8  and 9 . Additional Diagnoses:   Hospital Problems as of 2019 Date Reviewed: 2019          Codes Class Noted - Resolved POA    Delayed delivery after SROM (spontaneous rupture of membranes) ICD-10-CM: O42.90  ICD-9-CM: 658.20  2019 - Present         * (Principal) Normal repeat pregnancy, antepartum ICD-10-CM: Z34.90  ICD-9-CM: V22.1  1/10/2019 - Present Yes    Overview Addendum 2019  9:27 AM by Komal Zuniga MD     - Daquan Elena  5 female (2016)  - BOY (gender scan)                  Lab Results   Component Value Date/Time    ABO/Rh(D) B POSITIVE 2019 07:48 AM    Rubella, External Immune 10/03/2016    GrBStrep, External Positive 2019    ABO,Rh B Positive 10/03/2016    There is no immunization history for the selected administration types on file for this patient.     * Procedures:     [place]  Depression Scale  I have been able to laugh and see the funny side of things: As much as I always could  I have looked forward with enjoyment to things: As much as I ever did  I have blamed myself unnecessarily when things went wrong: Not very often  I have been anxious or worried for no good reason: Hardly ever  I have felt scared or panicky for no very good reason: No, not at all  Things have been getting on top of me: No, I have been coping as well as ever  I have been so unhappy that I have had difficulty sleeping: No, not at all  I have felt sad or miserable: No, not at all  I have been so unhappy that I have been crying: No, never  The thought of harming myself has occurred to me: Never  Total Score: 2    * Discharge Condition: good    * Hospital Course: Normal hospital course following the delivery. * Disposition: Home    Discharge Medications:   Current Discharge Medication List      START taking these medications    Details   ibuprofen (MOTRIN) 800 mg tablet Take 1 Tab by mouth every six (6) hours as needed for Pain. Qty: 90 Tab, Refills: 1         CONTINUE these medications which have NOT CHANGED    Details   cholecalciferol, vitamin D3, (VITAMIN D3) 2,000 unit tab Take  by mouth. PNV with Ca No.65-Iron Poly-FA 60 mg iron-1 mg cap Take  by mouth. Indications: Pregnancy             * Follow-up Care/Patient Instructions:   Activity: No sex for 6 weeks, No driving while on analgesics and No heavy lifting for 6 weeks  Diet: Regular Diet  Wound Care: Keep wound clean and dry    Follow-up Information     Follow up With Specialties Details Why Contact Info    Stef Dumont MD Obstetrics & Gynecology, Gynecology, Obstetrics In 6 weeks As needed and/or, If symptoms worsen [address]  [phone]      Unknown, Provider    Patient not available to ask

## 2019-07-18 NOTE — LACTATION NOTE
This note was copied from a baby's chart. In to follow up with mom and infant. Mom stated that infant is latching and nursing better. Instructed mom on how to position infant to get and maintain a deeper latch. Reviewed discharge information and answered mom's questions. Encouraged mom to follow up with our outpatient lactation consultant as needed.

## 2019-07-18 NOTE — LACTATION NOTE
This note was copied from a baby's chart. In to see mom and baby for feeding. Mom just started attempting baby on left breast. Sleepy. Encouraged her to get baby skin to skin and stimulate baby more awake, hand expression drop of colostrum to surface. Assisted in showing her how to get baby on deep and wide as some mild tenderness from prior shallow latching to tip of nipple. Baby got on well and fed for 18 minutes consistently. Did need stimulation at times but responded well w/ massage. Nipple round on release. Burped baby and latched baby to right breast. Fed another 10 minutes before coming off. Still offered to set up pump in case needed later if not doing full feeds. Mom states wants to see how next few feeds go as this one went very well and not well past post circumcision period. Encouraged her to be very proactive in getting full feed in baby 8-12 times per day, if not taking full needs now that well over 24 hrs, mom needs to pump and give back expressed colostrum w/ syringe. Mom verbalized understanding.  Lactation to follow up in am.

## 2022-11-07 NOTE — PROGRESS NOTES
Goal Outcome Evaluation:    Admitting Diagnosis: Pyelonephritis   Sepsis, due to unspecified organism, unspecified whether acute organ dysfunction present.    Patient A&Ox4. VSS. Afebrile on this shift. PRN Robitussin given x1 for infrequent productive cough and was effective. Denied SOB, just mild pain when coughing but decline intervention. PIV SL, Midline cath single Lumen in place on the right arm. Incontinent of B&B, X2 loose stool on this shift. Possibles discharge tomorrow. . Will continue to monitor.             PT to Room 428. Admitted for labor. Admission assessment as documented. Oriented to room, call system and white board. Plan of care reviewed and questions answered. Consents signed, identification band verified and placed. IV placed, blood work drawn per order and sent to lab.

## 2025-05-15 NOTE — ANESTHESIA POSTPROCEDURE EVALUATION
LVM to call back   S/p JAKE for labor and vaginal delivery. epidural    Anesthesia Post Evaluation      Multimodal analgesia: multimodal analgesia used between 6 hours prior to anesthesia start to PACU discharge  Patient location during evaluation: bedside  Patient participation: complete - patient participated  Level of consciousness: awake  Pain management: adequate  Airway patency: patent  Anesthetic complications: no  Cardiovascular status: acceptable and hemodynamically stable  Respiratory status: acceptable  Hydration status: acceptable  Post anesthesia nausea and vomiting:  none      No vitals data found for the desired time range.